# Patient Record
(demographics unavailable — no encounter records)

---

## 2024-11-13 NOTE — REVIEW OF SYSTEMS
[Patient Intake Form Reviewed] : Patient intake form was reviewed [Abn Vaginal bleeding] : abnormal vaginal bleeding [Pelvic pain] : pelvic pain [Negative] : Heme/Lymph [de-identified] : No tenderness, No nipple discharge and no adenopathy.

## 2024-11-13 NOTE — HISTORY OF PRESENT ILLNESS
[Patient reported PAP Smear was normal] : Patient reported PAP Smear was normal [Oral Contraceptive] : uses oral contraception pills [Y] : Patient is sexually active [N] : Patient denies prior pregnancies [Currently Active] : currently active [Men] : men [Yes] : Yes [No] : No [Patient would like to be screened for STIs] : Patient would like to be screened for STIs [FreeTextEntry1] : Initial GYN evaluation [TextBox_4] : 35YO F patient presents for GYN annual exam. 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 Patient would like children but at this moment not at this time. Patent states she always had irregular periods and extremely painful menses off ocp waking her from her sleep. Patient with history of irregular cycles and regularized on OCP but had to change type x3 due to mood swings or irregular cycles. Patient feels the best ( less pain) and with regular cycles on Lo Loestrin.  She reports stopped ocp LoLoestrin x 1 year and was in regular for a bit then back to irregular cycles and extremely painful ( waking her up from her sleep) and irregulary menses. Patient went back on the LoLoEstrein 4 months ago.   Patient reports her mother has PMDD and she feels she has PMS symptoms approx. 10 days prior to menses - but on Loloestrin no PMS symptoms  [PapSmeardate] : 1/2021 [HPVDate] : 1/2021 [LMPDate] : 10/23/24 [de-identified] : uses OCP for DYSMENORRHEA

## 2024-11-13 NOTE — PHYSICAL EXAM
[Chaperone Present] : A chaperone was present in the examining room during all aspects of the physical examination [Appropriately responsive] : appropriately responsive [Alert] : alert [No Acute Distress] : no acute distress [No Lymphadenopathy] : no lymphadenopathy [Oriented x3] : oriented x3 [Examination Of The Breasts] : a normal appearance [No Masses] : no breast masses were palpable [Labia Majora] : normal [Labia Minora] : normal [Normal] : normal [FreeTextEntry2] : [facility] [Soft] : soft [Non-tender] : non-tender [FreeTextEntry6] : no masses no tenderness no adenopathy. [Breast Palpation Diffuse Fibrous Tissue Bilateral] : fibrocystic changes [Uterine Adnexae] : non-palpable [FreeTextEntry8] : limited exam by voluntary guarding

## 2024-11-13 NOTE — DISCUSSION/SUMMARY
[FreeTextEntry1] : GYN Annual evaluation today -pap smear sent TV Sonogram for pelvic pain done today We discussed PMS, Pelvic pain, dysmenorrhea, endometriosis,  Patient verbalized understanding all explanations and her questions were all answered, and all concerns were addressed.  Patient was screened for depression - no signs of clinical depression. PHQ-2 on file Rx given for Pelvic MRI, Mammogram and B sonogram ( family hx of breast cancer Maternal GM). Advised patient to see ROS for egg freezing but states [employer] and will not  RTO in 3 months    IDarshan acting as scribe for Dr. Brooks. 11/13/2024     The documentation recorded by the scribe, in my presence, accurately reflects the service I personally performed, and the decisions made by me with my edits as appropriate on 11/13/2024  Judith Brooks MD, FACOG